# Patient Record
Sex: MALE | Race: WHITE | Employment: FULL TIME | URBAN - METROPOLITAN AREA
[De-identification: names, ages, dates, MRNs, and addresses within clinical notes are randomized per-mention and may not be internally consistent; named-entity substitution may affect disease eponyms.]

---

## 2019-12-19 ENCOUNTER — HOSPITAL ENCOUNTER (OUTPATIENT)
Age: 69
Discharge: HOME | End: 2019-12-21

## 2019-12-19 PROCEDURE — 88305 TISSUE EXAM BY PATHOLOGIST: CPT

## 2020-11-04 ENCOUNTER — APPOINTMENT (OUTPATIENT)
Dept: GENERAL RADIOLOGY | Age: 70
End: 2020-11-04
Payer: COMMERCIAL

## 2020-11-04 ENCOUNTER — HOSPITAL ENCOUNTER (EMERGENCY)
Age: 70
Discharge: HOME | End: 2020-11-04
Attending: EMERGENCY MEDICINE
Payer: COMMERCIAL

## 2020-11-04 VITALS
WEIGHT: 155 LBS | OXYGEN SATURATION: 95 % | HEART RATE: 72 BPM | BODY MASS INDEX: 23.49 KG/M2 | TEMPERATURE: 99.4 F | RESPIRATION RATE: 14 BRPM | DIASTOLIC BLOOD PRESSURE: 73 MMHG | SYSTOLIC BLOOD PRESSURE: 135 MMHG | HEIGHT: 68 IN

## 2020-11-04 LAB
ALBUMIN SERPL-MCNC: 4.4 G/DL (ref 3.5–5.2)
ALP BLD-CCNC: 90 U/L (ref 40–129)
ALT SERPL-CCNC: 22 U/L (ref 0–40)
ANION GAP SERPL CALCULATED.3IONS-SCNC: 9 MMOL/L (ref 7–16)
AST SERPL-CCNC: 30 U/L (ref 0–39)
BASOPHILS ABSOLUTE: 0.08 E9/L (ref 0–0.2)
BASOPHILS RELATIVE PERCENT: 1.2 % (ref 0–2)
BILIRUB SERPL-MCNC: 0.5 MG/DL (ref 0–1.2)
BILIRUBIN URINE: NEGATIVE
BLOOD, URINE: NEGATIVE
BUN BLDV-MCNC: 14 MG/DL (ref 8–23)
CALCIUM SERPL-MCNC: 9.6 MG/DL (ref 8.6–10.2)
CHLORIDE BLD-SCNC: 102 MMOL/L (ref 98–107)
CLARITY: CLEAR
CO2: 28 MMOL/L (ref 22–29)
COLOR: YELLOW
CREAT SERPL-MCNC: 1 MG/DL (ref 0.7–1.2)
EOSINOPHILS ABSOLUTE: 0.02 E9/L (ref 0.05–0.5)
EOSINOPHILS RELATIVE PERCENT: 0.3 % (ref 0–6)
GFR AFRICAN AMERICAN: >60
GFR NON-AFRICAN AMERICAN: >60 ML/MIN/1.73
GLUCOSE BLD-MCNC: 87 MG/DL (ref 74–99)
GLUCOSE URINE: NEGATIVE MG/DL
HCT VFR BLD CALC: 45 % (ref 37–54)
HEMOGLOBIN: 14.8 G/DL (ref 12.5–16.5)
IMMATURE GRANULOCYTES #: 0.01 E9/L
IMMATURE GRANULOCYTES %: 0.2 % (ref 0–5)
KETONES, URINE: NEGATIVE MG/DL
LACTIC ACID, SEPSIS: 0.9 MMOL/L (ref 0.5–1.9)
LEUKOCYTE ESTERASE, URINE: NEGATIVE
LYMPHOCYTES ABSOLUTE: 1.03 E9/L (ref 1.5–4)
LYMPHOCYTES RELATIVE PERCENT: 16 % (ref 20–42)
MCH RBC QN AUTO: 33.8 PG (ref 26–35)
MCHC RBC AUTO-ENTMCNC: 32.9 % (ref 32–34.5)
MCV RBC AUTO: 102.7 FL (ref 80–99.9)
MONOCYTES ABSOLUTE: 1.37 E9/L (ref 0.1–0.95)
MONOCYTES RELATIVE PERCENT: 21.3 % (ref 2–12)
NEUTROPHILS ABSOLUTE: 3.92 E9/L (ref 1.8–7.3)
NEUTROPHILS RELATIVE PERCENT: 61 % (ref 43–80)
NITRITE, URINE: NEGATIVE
PDW BLD-RTO: 13.2 FL (ref 11.5–15)
PH UA: 6 (ref 5–9)
PLATELET # BLD: 364 E9/L (ref 130–450)
PMV BLD AUTO: 10.5 FL (ref 7–12)
POTASSIUM SERPL-SCNC: 4.1 MMOL/L (ref 3.5–5)
PROCALCITONIN: 0.1 NG/ML (ref 0–0.08)
PROTEIN UA: NEGATIVE MG/DL
RBC # BLD: 4.38 E12/L (ref 3.8–5.8)
SARS-COV-2, NAAT: DETECTED
SODIUM BLD-SCNC: 139 MMOL/L (ref 132–146)
SPECIFIC GRAVITY UA: 1.01 (ref 1–1.03)
TOTAL PROTEIN: 7.9 G/DL (ref 6.4–8.3)
UROBILINOGEN, URINE: 0.2 E.U./DL
WBC # BLD: 6.4 E9/L (ref 4.5–11.5)

## 2020-11-04 PROCEDURE — 71045 X-RAY EXAM CHEST 1 VIEW: CPT

## 2020-11-04 PROCEDURE — 84145 PROCALCITONIN (PCT): CPT

## 2020-11-04 PROCEDURE — 85025 COMPLETE CBC W/AUTO DIFF WBC: CPT

## 2020-11-04 PROCEDURE — U0002 COVID-19 LAB TEST NON-CDC: HCPCS

## 2020-11-04 PROCEDURE — 36415 COLL VENOUS BLD VENIPUNCTURE: CPT

## 2020-11-04 PROCEDURE — 83605 ASSAY OF LACTIC ACID: CPT

## 2020-11-04 PROCEDURE — 81003 URINALYSIS AUTO W/O SCOPE: CPT

## 2020-11-04 PROCEDURE — 99284 EMERGENCY DEPT VISIT MOD MDM: CPT

## 2020-11-04 PROCEDURE — 80053 COMPREHEN METABOLIC PANEL: CPT

## 2020-11-04 PROCEDURE — 87040 BLOOD CULTURE FOR BACTERIA: CPT

## 2020-11-04 PROCEDURE — 2580000003 HC RX 258: Performed by: EMERGENCY MEDICINE

## 2020-11-04 RX ORDER — 0.9 % SODIUM CHLORIDE 0.9 %
1000 INTRAVENOUS SOLUTION INTRAVENOUS ONCE
Status: COMPLETED | OUTPATIENT
Start: 2020-11-04 | End: 2020-11-04

## 2020-11-04 RX ADMIN — SODIUM CHLORIDE 1000 ML: 9 INJECTION, SOLUTION INTRAVENOUS at 11:53

## 2020-11-04 NOTE — ED NOTES
Spoke to lab about approx. Time left for lab result, time is approx. 10-20 minutes for results yet.      Dominique Mcbride RN  11/04/20 1926

## 2020-11-04 NOTE — ED PROVIDER NOTES
HPI:  11/4/20,   Time: 11:38 AM JULIAN Benton is a 72 y.o. male presenting to the ED for fever, beginning 4 hours ago. The complaint has been persistent, mild in severity, and worsened by nothing. 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 He states that he gets his temperature checked every day at work and today when he went to work this morning his temperature was 100.5. Patient took Advil when he was at home and his temperature is nine 9.6 here. He states he had a little bit of a dry cough over the last 1 or 2 days. No shortness of breath no chest pain. He has not had any chills and the first time he had a fever was this morning. He denies any urinary complaints. No abdominal pain no nausea no vomiting. He has felt some mild fatigue over the past 24 hours, as well. No headache no neck pain or neck stiffness no rash. Also has a history of prostate cancer. Not currently on chemotherapy. Review of Systems:   Pertinent positives and negatives are stated within HPI, all other systems reviewed and are negative.          --------------------------------------------- PAST HISTORY ---------------------------------------------  Past Medical History:  has a past medical history of Cancer ([facility] [address] and Hyperlipidemia. Past Surgical History:  has a past surgical history that includes Splenectomy (05/1979). Social History:  reports that he quit smoking about 40 years ago. He has never used smokeless tobacco. He reports current alcohol use of about 2.0 - 5.0 standard drinks of alcohol per week. He reports that he does not use drugs. Family History: family history includes Heart Attack (age of onset: 62) in his father. The patients home medications have been reviewed. Allergies: Patient has no known allergies.         ---------------------------------------------------PHYSICAL EXAM--------------------------------------    Constitutional/General: Alert and oriented x3, well appearing, non toxic in NAD  Head: Normocephalic and atraumatic  Eyes: PERRL, EOMI, conjunctive normal, sclera non icteric  Mouth: Oropharynx clear, handling secretions, no trismus, no asymmetry of the posterior oropharynx or uvular edema  Neck: Supple, full ROM, non tender to palpation in the midline, no stridor, no crepitus, no meningeal signs  Respiratory: Lungs clear to auscultation bilaterally, no wheezes, rales, or rhonchi. Not in respiratory distress  Cardiovascular:  Regular rate. Regular rhythm. No murmurs, gallops, or rubs. 2+ distal pulses  Chest: No chest wall tenderness  GI:  Abdomen Soft, Non tender, Non distended. +BS. No organomegaly, no palpable masses,  No rebound, guarding, or rigidity. Musculoskeletal: Moves all extremities x 4. Warm and well perfused, no clubbing, cyanosis, or edema. Capillary refill <3 seconds  Integument: skin warm and dry. No rashes. Lymphatic: no lymphadenopathy noted  Neurologic: GCS 15, no focal deficits, symmetric strength 5/5 in the upper and lower extremities bilaterally  Psychiatric: Normal Affect    -------------------------------------------------- RESULTS -------------------------------------------------  I have personally reviewed all laboratory and imaging results for this patient. Results are listed below.      LABS:  Results for orders placed or performed during the hospital encounter of 11/04/20   Culture, Blood 1    Specimen: Blood   Result Value Ref Range    Blood Culture, Routine 24 Hours no growth    Culture, Blood 2    Specimen: Blood   Result Value Ref Range    Culture, Blood 2 24 Hours no growth    CBC Auto Differential   Result Value Ref Range    WBC 6.4 4.5 - 11.5 E9/L    RBC 4.38 3.80 - 5.80 E12/L    Hemoglobin 14.8 12.5 - 16.5 g/dL    Hematocrit 45.0 37.0 - 54.0 %    .7 (H) 80.0 - 99.9 fL    MCH 33.8 26.0 - 35.0 pg    MCHC 32.9 32.0 - 34.5 % RDW 13.2 11.5 - 15.0 fL    Platelets 131 801 - 093 E9/L    MPV 10.5 7.0 - 12.0 fL    Neutrophils % 61.0 43.0 - 80.0 %    Immature Granulocytes % 0.2 0.0 - 5.0 %    Lymphocytes % 16.0 (L) 20.0 - 42.0 %    Monocytes % 21.3 (H) 2.0 - 12.0 %    Eosinophils % 0.3 0.0 - 6.0 %    Basophils % 1.2 0.0 - 2.0 %    Neutrophils Absolute 3.92 1.80 - 7.30 E9/L    Immature Granulocytes # 0.01 E9/L    Lymphocytes Absolute 1.03 (L) 1.50 - 4.00 E9/L    Monocytes Absolute 1.37 (H) 0.10 - 0.95 E9/L    Eosinophils Absolute 0.02 (L) 0.05 - 0.50 E9/L    Basophils Absolute 0.08 0.00 - 0.20 E9/L   Comprehensive Metabolic Panel   Result Value Ref Range    Sodium 139 132 - 146 mmol/L    Potassium 4.1 3.5 - 5.0 mmol/L    Chloride 102 98 - 107 mmol/L    CO2 28 22 - 29 mmol/L    Anion Gap 9 7 - 16 mmol/L    Glucose 87 74 - 99 mg/dL    BUN 14 8 - 23 mg/dL    CREATININE 1.0 0.7 - 1.2 mg/dL    GFR Non-African American >60 >=60 mL/min/1.73    GFR African American >60     Calcium 9.6 8.6 - 10.2 mg/dL    Total Protein 7.9 6.4 - 8.3 g/dL    Alb 4.4 3.5 - 5.2 g/dL    Total Bilirubin 0.5 0.0 - 1.2 mg/dL    Alkaline Phosphatase 90 40 - 129 U/L    ALT 22 0 - 40 U/L    AST 30 0 - 39 U/L   Lactate, Sepsis   Result Value Ref Range    Lactic Acid, Sepsis 0.9 0.5 - 1.9 mmol/L   Urinalysis   Result Value Ref Range    Color, UA Yellow Straw/Yellow    Clarity, UA Clear Clear    Glucose, Ur Negative Negative mg/dL    Bilirubin Urine Negative Negative    Ketones, Urine Negative Negative mg/dL    Specific Gravity, UA 1.010 1.005 - 1.030    Blood, Urine Negative Negative    pH, UA 6.0 5.0 - 9.0    Protein, UA Negative Negative mg/dL    Urobilinogen, Urine 0.2 <2.0 E.U./dL    Nitrite, Urine Negative Negative    Leukocyte Esterase, Urine Negative Negative   COVID-19   Result Value Ref Range    SARS-CoV-2, NAAT DETECTED (A) Not Detected   Procalcitonin   Result Value Ref Range    Procalcitonin 0.10 (H) 0.00 - 0.08 ng/mL       RADIOLOGY:  Interpreted by Radiologist.  XR CHEST PORTABLE   Final Result   No acute process. ------------------------- NURSING NOTES AND VITALS REVIEWED ---------------------------   The nursing notes within the ED encounter and vital signs as below have been reviewed by myself. /73   Pulse 72   Temp 99.4 °F (37.4 °C) (Oral)   Resp 14   Ht 5' 8\" (1.727 m)   Wt 155 lb (70.3 kg)   SpO2 95%   BMI 23.57 kg/m²   Oxygen Saturation Interpretation: Normal    The patients available past medical records and past encounters were reviewed. ------------------------------ ED COURSE/MEDICAL DECISION MAKING----------------------  Medications   0.9 % sodium chloride bolus (0 mLs Intravenous Stopped 11/4/20 1403)         ED COURSE:       Medical Decision Making:    Patient is a pleasant 60-year-old and with history of prior splenectomy. He had a low-grade temperature today and has had some dry cough over the past 2 days. He has no shortness of breath no chest pain is not hypoxic he has temperatures 99.6 here. Given his history of splenectomy, he will have a full septic work-up will do a rapid Covid test here. Patient will have x-ray and urine testing as well. Differential diagnosis includes Covid, viral syndrome, fever of unknown source, UTI, pneumonia. This patient has remained hemodynamically stable during their ED course. Patient is CBC here that was normal with a white count of 6.4. Chemistry was normal with a normal creatinine. Urinalysis is negative for acute infection. Recent chest x-ray showed no acute process. Covid testing was positive. I spoke to infectious disease on call given that the patient otherwise can be discharged home given that he is nontoxic and very minimally symptomatic with a positive Covid.   However, given his prior splenectomy wanted to make sure that they were comfortable with that plan I spoke to them and they recommended a order procalcitonin I did order the procalcitonin calcitonin is very low at 0.1. Jessica Mendoza, the infectious disease physician is comfortable the patient being discharged home. She feels as reassuring that the his low-grade fever was due to Covid and viral infection only and he patient does not require antibiotics at this point despite his asplenic condition. Patient is comfortable with this plan he is very minimally symptomatic here. He is comfortable with the outpatient plan will follow up return for any new or worsening symptoms if he has any worsening complaints to return to the emergency department. Blood cultures were sent off he will be called if they are positive to return immediately to the department. Patient will follow-up outpatient with his PCP and return to the ER as needed. Patient says that no signs of sepsis here . He has a normal white blood cell count he is not tachycardic he is not hypoxic is not hypotensive. He has stable vital signs and is asymptomatic on discharge. Re-Evaluations:             Re-evaluation. Patients symptoms are improving. Clinically is asymptomatic in the department and resting comfortably. He has quite minimal cough. .  He is not hypoxic resting comfortably eating drinking and tolerating fluids well. He has been doing work on his phone awaiting for lab results to come back and is very comfortable with the outpatient plan for follow-up he is is was made aware that he will be called if the blood cultures are positive. Should he develop any new or worsening symptoms to return immediately to the department given his asplenic status. Re-examination  11/4/20   11:38 AM EST          Vital Signs:   Vitals:    11/04/20 1127 11/04/20 1258 11/04/20 1931   BP: (!) 151/86 (!) 164/85 135/73   Pulse: 70 86 72   Resp: 14 14 14   Temp: 99.5 °F (37.5 °C) 99.4 °F (37.4 °C)    TempSrc: Oral Oral    SpO2: 96% 98% 95%   Weight: 155 lb (70.3 kg)     Height: 5' 8\" (1.727 m)               Consultations:              To Dr. Kianna Hernandez the from infectious disease. She recommended we add on a procalcitonin if this is within normal range is comfortable the patient being discharged home with close outpatient follow-up. Dr. Katherine Malik once the procalcitonin returned. Procalcitonin was 0.1. Given this very low procalcitonin is very low likelihood that this would be a bacterial infection. This level is consistent with a viral infection. She is comfortable with the patient being discharged home without antibiotics. She feels this is clearly indicative of a viral infection, consistent with his COVID-19 diagnosis. Patient's not hypoxic and able to follow-up outpatient with his PCP. Blood cultures were sent should he have any new worsening symptoms or positive cultures he can return to the department and positive cultures were he will be called back to the department. Patient is comfortable with this outpatient plan and will follow up with his PCP this week. Counseling: The emergency provider has spoken with the patient and discussed todays results, in addition to providing specific details for the plan of care and counseling regarding the diagnosis and prognosis. Questions are answered at this time and they are agreeable with the plan.       --------------------------------- IMPRESSION AND DISPOSITION ---------------------------------    IMPRESSION  1. COVID-19 virus infection    2. Viral illness        DISPOSITION  Disposition: Discharge to home  Patient condition is stable    NOTE: This report was transcribed using voice recognition software.  Every effort was made to ensure accuracy; however, inadvertent computerized transcription errors may be present        Toribio Phipps MD  11/05/20 1940

## 2020-11-05 ENCOUNTER — CARE COORDINATION (OUTPATIENT)
Dept: CARE COORDINATION | Age: 70
End: 2020-11-05

## 2020-11-05 NOTE — CARE COORDINATION
Date/Time:  11/5/2020 1:45 PM  Attempted to reach patient by telephone. Call within 2 business days of discharge: Yes  Unable to leave a message. Will attempt to reach patient again.

## 2020-11-05 NOTE — ED NOTES
Discharge instructions given. Patient verbalizes understanding. No other noted or stated problems at this time. Patient will follow up with primary care.      Jacinto Howe RN  11/04/20 3190

## 2020-11-07 ENCOUNTER — CARE COORDINATION (OUTPATIENT)
Dept: CARE COORDINATION | Age: 70
End: 2020-11-07

## 2020-11-07 NOTE — CARE COORDINATION
Date/Time:  11/7/2020 12:32 PM  Second attempt to reach patient by telephone. Call within 2 business days of discharge: Yes  Unable to leave a message. Will attempt to reach patient again.

## 2020-11-09 ENCOUNTER — CARE COORDINATION (OUTPATIENT)
Dept: CARE COORDINATION | Age: 70
End: 2020-11-09

## 2020-11-09 LAB
BLOOD CULTURE, ROUTINE: NORMAL
CULTURE, BLOOD 2: NORMAL

## 2020-11-09 NOTE — CARE COORDINATION
Date/Time:  11/9/2020 3:56 PM  Third and final attempt to reach patient by telephone. Call within 2 business days of discharge: Yes Left HIPPA compliant message requesting a return call.

## 2021-12-16 ENCOUNTER — HOSPITAL ENCOUNTER (OUTPATIENT)
Age: 71
Discharge: HOME | End: 2021-12-18

## 2021-12-16 PROCEDURE — 88342 IMHCHEM/IMCYTCHM 1ST ANTB: CPT

## 2021-12-16 PROCEDURE — 88305 TISSUE EXAM BY PATHOLOGIST: CPT
